# Patient Record
(demographics unavailable — no encounter records)

---

## 2025-07-23 NOTE — END OF VISIT
[FreeTextEntry3] : I, Leilawilly Irwinnelly, acted as a scribe on behalf of Dr. Brownlee on 07/23/2025 .  All medical entries made by the scribe were at my, Dr. Brownlee, direction and personally dictated by me on 07/23/2025 . I have reviewed the chart and agree that the record accurately reflects my personal performance of the history, physical exam, assessment and plan. I have also personally directed, reviewed, and agreed with the chart.

## 2025-07-23 NOTE — HISTORY OF PRESENT ILLNESS
[FreeTextEntry1] :  30 year old  female presents for annual exam. Pt c/o incontinence when she laughs, coughs etc. Pt wondering about using "the wand for incontieence.  Pt now on the patch for birth control. desires more children in the future   Denies any new PMHx, PSHx, FamHx. Denies belly pain, chest pain, shortness of breath.  Gyn: menarche 9-9yo, monthly menses q28 days, lasting 3-4 days. s/p Gardasil x3 (), fibroids, endometriosis Ob: TOP x2 ( 3/19/22, ) - no complications PMH: none PSH: breast abscess removal (), D&c x2 FamH: DM(mother, MGM), breast ca (MGM)

## 2025-07-23 NOTE — PLAN
[FreeTextEntry1] :  30 year old female presents for annual exam.  -HPV/Pap performed today -Discussed urinary incontinence, stress incontinence, discussed urogyn will defer for now -Referral for pelvic pt  -RTO 1 year for annual Rachna Brownlee MD